# Patient Record
Sex: FEMALE | Race: WHITE | NOT HISPANIC OR LATINO | Employment: FULL TIME | URBAN - METROPOLITAN AREA
[De-identification: names, ages, dates, MRNs, and addresses within clinical notes are randomized per-mention and may not be internally consistent; named-entity substitution may affect disease eponyms.]

---

## 2018-07-09 ENCOUNTER — OFFICE VISIT (OUTPATIENT)
Dept: MEDICAL GROUP | Facility: MEDICAL CENTER | Age: 23
End: 2018-07-09
Payer: COMMERCIAL

## 2018-07-09 VITALS
TEMPERATURE: 99 F | SYSTOLIC BLOOD PRESSURE: 100 MMHG | BODY MASS INDEX: 25.83 KG/M2 | DIASTOLIC BLOOD PRESSURE: 70 MMHG | WEIGHT: 155 LBS | HEIGHT: 65 IN | OXYGEN SATURATION: 98 % | HEART RATE: 79 BPM

## 2018-07-09 DIAGNOSIS — J45.20 MILD INTERMITTENT ASTHMA WITHOUT COMPLICATION: ICD-10-CM

## 2018-07-09 DIAGNOSIS — E55.9 VITAMIN D DEFICIENCY: ICD-10-CM

## 2018-07-09 DIAGNOSIS — Z13.220 SCREENING FOR HYPERLIPIDEMIA: ICD-10-CM

## 2018-07-09 DIAGNOSIS — M25.571 ACUTE RIGHT ANKLE PAIN: ICD-10-CM

## 2018-07-09 DIAGNOSIS — Z13.89 SCREENING FOR MULTIPLE CONDITIONS: ICD-10-CM

## 2018-07-09 DIAGNOSIS — M79.674 GREAT TOE PAIN, RIGHT: ICD-10-CM

## 2018-07-09 DIAGNOSIS — M79.671 PAIN OF RIGHT HEEL: ICD-10-CM

## 2018-07-09 DIAGNOSIS — Z23 NEED FOR TDAP VACCINATION: ICD-10-CM

## 2018-07-09 DIAGNOSIS — Z23 NEED FOR HPV VACCINATION: ICD-10-CM

## 2018-07-09 PROCEDURE — 90715 TDAP VACCINE 7 YRS/> IM: CPT | Performed by: NURSE PRACTITIONER

## 2018-07-09 PROCEDURE — 90471 IMMUNIZATION ADMIN: CPT | Performed by: NURSE PRACTITIONER

## 2018-07-09 PROCEDURE — 90651 9VHPV VACCINE 2/3 DOSE IM: CPT | Performed by: NURSE PRACTITIONER

## 2018-07-09 PROCEDURE — 90472 IMMUNIZATION ADMIN EACH ADD: CPT | Performed by: NURSE PRACTITIONER

## 2018-07-09 PROCEDURE — 99214 OFFICE O/P EST MOD 30 MIN: CPT | Mod: 25 | Performed by: NURSE PRACTITIONER

## 2018-07-09 RX ORDER — ALBUTEROL SULFATE 90 UG/1
2 AEROSOL, METERED RESPIRATORY (INHALATION) EVERY 6 HOURS PRN
Qty: 8.5 G | Refills: 0 | Status: SHIPPED | OUTPATIENT
Start: 2018-07-09

## 2018-07-09 RX ORDER — MULTIVIT-MIN/IRON/FOLIC ACID/K 18-600-40
2000 CAPSULE ORAL DAILY
Qty: 30 CAP | Refills: 0
Start: 2018-07-09

## 2018-07-09 RX ORDER — DICLOFENAC SODIUM 75 MG/1
75 TABLET, DELAYED RELEASE ORAL 2 TIMES DAILY
Qty: 30 TAB | Refills: 0 | Status: SHIPPED | OUTPATIENT
Start: 2018-07-09 | End: 2018-12-26

## 2018-07-09 ASSESSMENT — PATIENT HEALTH QUESTIONNAIRE - PHQ9: CLINICAL INTERPRETATION OF PHQ2 SCORE: 0

## 2018-07-10 NOTE — PROGRESS NOTES
Subjective:     Brigid Fernandes is a 23 y.o. female who presents with rt ankle pain.    HPI:   Seen in f/u for ankle pain.  She twisted it at easter.  Still hurting occas  Also have pain in DIP joint great toe.  That has been occurring for several weelks.  It is also popping with moving.  She is having sx of plantar fasciititis.  Not always wearing supportive shoes.  Her worst pain is after walking for awhile.    She has been going to [facility] until she graduated.  When she last went in they asked her if she had asthma.  seh was prescribed inhaler.  She will havei ssues with exercise and occas at nite with wheezing.    She is having sleep paralysis.  It is occurring 2x/wk.  Mother thinks r/t her asthma.    She has hx of low vitamin d.  Took ergocalciferol .  Now on otc supplement.    She is due Td and HPV.   She is due prevenative lab with [facility].      Patient Active Problem List    Diagnosis Date Noted   • Chronic tonsillitis 03/08/2013       Current medicines (including changes today)  Current Outpatient Prescriptions   Medication Sig Dispense Refill   • Cholecalciferol (VITAMIN D) 2000 units Cap Take 1 Cap by mouth every day. 30 Cap 0   • diclofenac EC (VOLTAREN) 75 MG Tablet Delayed Response Take 1 Tab by mouth 2 times a day. 30 Tab 0   • albuterol 108 (90 Base) MCG/ACT Aero Soln inhalation aerosol Inhale 2 Puffs by mouth every 6 hours as needed for Shortness of Breath. 8.5 g 0     No current facility-administered medications for this visit.        Allergies   Allergen Reactions   • Nkda [No Known Drug Allergy]        ROS  Constitutional: Negative. Negative for fever, chills, weight loss, malaise/fatigue and diaphoresis.   HENT: Negative. Negative for hearing loss, ear pain, nosebleeds, congestion, sore throat, neck pain, tinnitus and ear discharge.   Respiratory: Negative. Negative for cough, hemoptysis, sputum production, stridor.   Cardiovascular: Negative. Negative for chest pain, palpitations,  "orthopnea, claudication, leg swelling and PND.   Gastrointestinal: Denies nausea, vomiting, diarrhea, constipation, heartburn, melena or hematochezia.  Genitourinary: Denies dysuria, hematuria, urinary incontinence, frequency or urgency.        Objective:     Blood pressure 100/70, pulse 79, temperature 37.2 °C (99 °F), height 1.651 m (5' 5\"), weight 70.3 kg (155 lb), SpO2 98 %, not currently breastfeeding. Body mass index is 25.79 kg/m².    Physical Exam:  Vitals reviewed.  Constitutional: Oriented to person, place, and time. appears well-developed and well-nourished. No distress.   Cardiovascular: Normal rate, regular rhythm, normal heart sounds and intact distal pulses. Exam reveals no gallop and no friction rub. No murmur heard. No carotid bruits.   Pulmonary/Chest: Effort normal and breath sounds normal. No stridor. No respiratory distress. no wheezes or rales. exhibits no tenderness.   Musculoskeletal: Normal range of motion but pain in rt great toe. exhibits no edema. haley pedal pulses 2+.  Lymphadenopathy: No cervical or supraclavicular adenopathy.   Neurological: Alert and oriented to person, place, and time. exhibits normal muscle tone.  Skin: Skin is warm and dry. No diaphoresis.   Psychiatric: Normal mood and affect. Behavior is normal.      Assessment and Plan:     The following treatment plan was discussed:    1. Acute right ankle pain  DX-FOOT-COMPLETE 3+ RIGHT    DX-ANKLE 3+ VIEWS RIGHT    diclofenac EC (VOLTAREN) 75 MG Tablet Delayed Response    try voltaren.  do xray of foot and ankel.  f/u with tp with resutls and approp f/u tx.     2. Great toe pain, right  DX-FOOT-COMPLETE 3+ RIGHT    DX-ANKLE 3+ VIEWS RIGHT    diclofenac EC (VOLTAREN) 75 MG Tablet Delayed Response    xray foot and f/u with pt wiht results.  try voltaren to see if helps sx.     3. Pain of right heel  DX-FOOT-COMPLETE 3+ RIGHT    DX-ANKLE 3+ VIEWS RIGHT    diclofenac EC (VOLTAREN) 75 MG Tablet Delayed Response   4. Mild " intermittent asthma without complication  albuterol 108 (90 Base) MCG/ACT Aero Soln inhalation aerosol    PULMONARY FUNCTION TESTS Test requested: Complete Pulmonary Function Test    refilled inhaler.  use beofre exercise and prn.  do PFT's. f/u with pt with resuts.    5. Vitamin D deficiency  Cholecalciferol (VITAMIN D) 2000 units Cap    VITAMIN D 25-HYDROXY    check lab with vitamin d, [facility]CMP, [facility].  f/u with pt wiht results.    6. Need for HPV vaccination  Gardasil 9   7. Screening for multiple conditions  CMP14+LP   8. Screening for hyperlipidemia  CMP14+LP   9. Need for Tdap vaccination  Tdap =>8yo IM         Followup: Return in about 1 year (around 7/9/2019).

## 2018-07-19 ENCOUNTER — HOSPITAL ENCOUNTER (OUTPATIENT)
Dept: PULMONOLOGY | Facility: MEDICAL CENTER | Age: 23
End: 2018-07-19
Attending: NURSE PRACTITIONER
Payer: COMMERCIAL

## 2018-07-19 PROCEDURE — 94726 PLETHYSMOGRAPHY LUNG VOLUMES: CPT | Mod: 26 | Performed by: INTERNAL MEDICINE

## 2018-07-19 PROCEDURE — 94060 EVALUATION OF WHEEZING: CPT

## 2018-07-19 PROCEDURE — 94729 DIFFUSING CAPACITY: CPT | Mod: 26 | Performed by: INTERNAL MEDICINE

## 2018-07-19 PROCEDURE — 94060 EVALUATION OF WHEEZING: CPT | Mod: 26 | Performed by: INTERNAL MEDICINE

## 2018-07-19 PROCEDURE — 94729 DIFFUSING CAPACITY: CPT

## 2018-07-19 PROCEDURE — 94726 PLETHYSMOGRAPHY LUNG VOLUMES: CPT

## 2018-07-19 ASSESSMENT — PULMONARY FUNCTION TESTS
FVC: 4.35
FVC_PREDICTED: 3.94
FEV1/FVC: 384
FVC: 4.54
FEV1_PERCENT_PREDICTED: 111
FEV1_LLN: 2.86
FVC_LLN: 3.29
FEV1/FVC_PERCENT_PREDICTED: 96
FVC_PERCENT_PREDICTED: 110
FEV1/FVC: 85
FEV1: 3.68
FEV1/FVC: 84.14
FEV1/FVC_PREDICTED: 87
FEV1/FVC_PERCENT_CHANGE: 75
FEV1_PERCENT_PREDICTED: 107
FEV1/FVC_PERCENT_PREDICTED: 97
FEV1_PERCENT_CHANGE: 4
FEV1/FVC_PERCENT_PREDICTED: 97
FEV1: 3.82
FEV1/FVC: 84
FEV1/FVC_PERCENT_PREDICTED: 87
FVC_PERCENT_PREDICTED: 115
FEV1/FVC_PERCENT_PREDICTED: 97
FEV1_PREDICTED: 3.42
FVC_LLN: 3.29
FEV1/FVC_PERCENT_LLN: 73
FEV1/FVC_PERCENT_LLN: 73
FEV1/FVC_PERCENT_CHANGE: 0
FEV1_LLN: 2.86
FEV1_PERCENT_CHANGE: 3

## 2018-07-20 NOTE — PROCEDURES
DATE OF TEST:  07/19/2018.    ATTENDING PHYSICIAN:  JENELLE Moody    INTERPRETATION:  Spirometry shows forced vital capacity is normal at 110% of   predicted value of 4.35, FEV1 is 107% with a value of 3.68 and FEV1/forced   vital capacity ratio is 84%.  The mid forced expiratory flow likewise is   normal at 91%.  A repeat spirometry after bronchodilator therapy shows no   significant change.    The lung volume study shows normal flow vital capacity at 108% with a value of   4.29.  Total gas volume 102% with a value of 2.91.  Residual volume is 1.52,   118%.  Total lung capacity 5.8, 111% and RV/TLC ratio is 26, which is 108%.    Diffusing capacity for carbon monoxide is normal at 112% of predicted with a   value of 29.38 and when corrected for hemoglobin content, it is 27.25 which is   104% of predicted.    CONCLUSION:  1.  Spirometry is normal, showing no airflow obstruction with no remarkable   change after bronchodilator therapy.  2.  Subdivision of the lung volumes are normal.  No restriction, no   hyperinflation, no air-trapping.  3.  Normal diffusing capacity for carbon monoxide.  4.  Normal pulmonary function tests.       ____________________________________     MD SIMÓN Evans / CB    DD:  07/19/2018 17:43:12  DT:  07/19/2018 18:09:13    D#:  [id number]  Job#:  [id number]

## 2018-07-23 ENCOUNTER — TELEPHONE (OUTPATIENT)
Dept: MEDICAL GROUP | Facility: MEDICAL CENTER | Age: 23
End: 2018-07-23

## 2018-07-24 NOTE — PROCEDURES
DATE OF STUDY:  07/19/2018    REASON FOR THE STUDY:  Mild intermittent asthma in a nonsmoker.  ATS standards   were met.    SPIROMETRY:  FVC is 4.35, 110% predicted.  FEV1 is 3.68, 107% predicted.    Response to bronchodilators is not considered significant.  FEV1/FVC is 84%.    MVV is 103% predicted.    LUNG VOLUMES:  Slow vital capacity is 4.29, 108% predicted.  Total lung   capacity is 111% predicted.  RV/TLC is 108% predicted.  Diffusion is normal.    Flow volume loops are normal.    IMPRESSION:  This is a normal study.       ____________________________________     MD SYLVIA HUTCHINSON / CB    DD:  07/23/2018 14:59:18  DT:  07/23/2018 15:15:15    D#:  [id number]  Job#:  [id number]

## 2018-09-04 ENCOUNTER — HOSPITAL ENCOUNTER (OUTPATIENT)
Facility: MEDICAL CENTER | Age: 23
End: 2018-09-04
Attending: NURSE PRACTITIONER
Payer: COMMERCIAL

## 2018-09-04 ENCOUNTER — OFFICE VISIT (OUTPATIENT)
Dept: MEDICAL GROUP | Facility: MEDICAL CENTER | Age: 23
End: 2018-09-04
Payer: COMMERCIAL

## 2018-09-04 VITALS
DIASTOLIC BLOOD PRESSURE: 70 MMHG | BODY MASS INDEX: 25.83 KG/M2 | OXYGEN SATURATION: 97 % | WEIGHT: 155 LBS | HEIGHT: 65 IN | HEART RATE: 89 BPM | SYSTOLIC BLOOD PRESSURE: 110 MMHG | TEMPERATURE: 98.1 F

## 2018-09-04 DIAGNOSIS — N30.01 ACUTE CYSTITIS WITH HEMATURIA: ICD-10-CM

## 2018-09-04 LAB
APPEARANCE UR: NORMAL
BILIRUB UR STRIP-MCNC: NORMAL MG/DL
COLOR UR AUTO: YELLOW
GLUCOSE UR STRIP.AUTO-MCNC: NORMAL MG/DL
KETONES UR STRIP.AUTO-MCNC: NORMAL MG/DL
LEUKOCYTE ESTERASE UR QL STRIP.AUTO: NORMAL
NITRITE UR QL STRIP.AUTO: NORMAL
PH UR STRIP.AUTO: 6.5 [PH] (ref 5–8)
PROT UR QL STRIP: NORMAL MG/DL
RBC UR QL AUTO: NORMAL
SP GR UR STRIP.AUTO: 1.02
UROBILINOGEN UR STRIP-MCNC: 0.2 MG/DL

## 2018-09-04 PROCEDURE — 87186 SC STD MICRODIL/AGAR DIL: CPT

## 2018-09-04 PROCEDURE — 87077 CULTURE AEROBIC IDENTIFY: CPT

## 2018-09-04 PROCEDURE — 99214 OFFICE O/P EST MOD 30 MIN: CPT | Performed by: NURSE PRACTITIONER

## 2018-09-04 PROCEDURE — 81001 URINALYSIS AUTO W/SCOPE: CPT

## 2018-09-04 PROCEDURE — 81002 URINALYSIS NONAUTO W/O SCOPE: CPT | Performed by: NURSE PRACTITIONER

## 2018-09-04 PROCEDURE — 87086 URINE CULTURE/COLONY COUNT: CPT

## 2018-09-04 RX ORDER — SULFAMETHOXAZOLE AND TRIMETHOPRIM 800; 160 MG/1; MG/1
1 TABLET ORAL 2 TIMES DAILY
Qty: 14 TAB | Refills: 0 | Status: SHIPPED | OUTPATIENT
Start: 2018-09-04 | End: 2018-10-02 | Stop reason: SDUPTHER

## 2018-09-04 NOTE — PROGRESS NOTES
"  Subjective:     Brigid Fernandes is a 23 y.o. female who presents with UTI.      HPI:   Seen in f/u for UTI.  Sx started yesterday.  She was up all nite. + dysuria.  Has been drinking a lot of water. No back or abd pain.  No fever, chills or sweating.  Has had UTI in the past and sx are similar.  + urinary freq and urgency.      Patient Active Problem List    Diagnosis Date Noted   • Chronic tonsillitis 03/08/2013       Current medicines (including changes today)  Current Outpatient Prescriptions   Medication Sig Dispense Refill   • sulfamethoxazole-trimethoprim (BACTRIM DS) 800-160 MG tablet Take 1 Tab by mouth 2 times a day. 14 Tab 0   • Cholecalciferol (VITAMIN D) 2000 units Cap Take 1 Cap by mouth every day. 30 Cap 0   • diclofenac EC (VOLTAREN) 75 MG Tablet Delayed Response Take 1 Tab by mouth 2 times a day. 30 Tab 0   • albuterol 108 (90 Base) MCG/ACT Aero Soln inhalation aerosol Inhale 2 Puffs by mouth every 6 hours as needed for Shortness of Breath. 8.5 g 0     No current facility-administered medications for this visit.        Allergies   Allergen Reactions   • Nkda [No Known Drug Allergy]        ROS  Constitutional: Negative. Negative for fever, chills, weight loss, malaise/fatigue and diaphoresis.   HENT: Negative. Negative for hearing loss, ear pain, nosebleeds, congestion, sore throat, neck pain, tinnitus and ear discharge.   Respiratory: Negative. Negative for cough, hemoptysis, sputum production, shortness of breath, wheezing and stridor.   Cardiovascular: Negative. Negative for chest pain, palpitations, orthopnea, claudication, leg swelling and PND.   Gastrointestinal: Denies nausea, vomiting, diarrhea, constipation, heartburn, melena or hematochezia.  Genitourinary: Denies hematuria, urinary incontinence.        Objective:     Blood pressure 110/70, pulse 89, temperature 36.7 °C (98.1 °F), height 1.651 m (5' 5\"), weight 70.3 kg (155 lb), last menstrual period 07/30/2018, SpO2 97 %, not " currently breastfeeding. Body mass index is 25.79 kg/m².    Physical Exam:  Physical Exam   Vitals reviewed.  Constitutional: oriented to person, place, and time. appears well-developed and well-nourished. No distress.   Cardiovascular: Normal rate, regular rhythm, normal heart sounds and intact distal pulses.  Exam reveals no gallop and no friction rub.  No murmur heard.  No carotid bruits.   Pulmonary/Chest: Effort normal and breath sounds normal. No stridor. No respiratory distress. no wheezes or rales. exhibits no tenderness.   Musculoskeletal: Normal range of motion. exhibits no edema. haley pedal pulses 2+.  Lymphadenopathy: no cervical or supraclavicular adenopathy.   Abd:  No CVAT,  Soft,  Bs noted in all quadrants.  No HSM.  Mild suprapubic abdominal tenderness.  Neurological: alert and oriented to person, place, and time. exhibits normal muscle tone. Coordination normal.   Skin: Skin is warm and dry. no diaphoresis.   Psychiatric: normal mood and affect. behavior is normal.       Assessment and Plan:     The following treatment plan was discussed:    1. Acute cystitis with hematuria  sulfamethoxazole-trimethoprim (BACTRIM DS) 800-160 MG tablet    URINALYSIS,CULTURE IF INDICATED    URINE CULTURE(NEW)    UA in office + with nitrates, leukocytes and blood.  bactrim x 7 days.  UA/CX to lab.  f/u if cx shows bactrim not sensitive.  then f/u prn and 7/19.           Followup: Return if symptoms worsen or fail to improve.

## 2018-09-05 LAB
APPEARANCE UR: ABNORMAL
BACTERIA #/AREA URNS HPF: ABNORMAL /HPF
COLOR UR: YELLOW
EPI CELLS #/AREA URNS HPF: ABNORMAL /HPF
GLUCOSE UR STRIP.AUTO-MCNC: NEGATIVE MG/DL
HYALINE CASTS #/AREA URNS LPF: ABNORMAL /LPF
KETONES UR STRIP.AUTO-MCNC: NEGATIVE MG/DL
LEUKOCYTE ESTERASE UR QL STRIP.AUTO: ABNORMAL
MICRO URNS: ABNORMAL
NITRITE UR QL STRIP.AUTO: POSITIVE
PH UR STRIP.AUTO: 7 [PH]
PROT UR QL STRIP: NEGATIVE MG/DL
RBC # URNS HPF: ABNORMAL /HPF
RBC UR QL AUTO: ABNORMAL
SP GR UR STRIP.AUTO: 1.01
WBC #/AREA URNS HPF: ABNORMAL /HPF

## 2018-09-07 LAB
BACTERIA UR CULT: ABNORMAL
BACTERIA UR CULT: ABNORMAL
SIGNIFICANT IND 70042: ABNORMAL
SITE SITE: ABNORMAL
SOURCE SOURCE: ABNORMAL

## 2018-09-12 ENCOUNTER — TELEPHONE (OUTPATIENT)
Dept: MEDICAL GROUP | Facility: MEDICAL CENTER | Age: 23
End: 2018-09-12

## 2018-10-02 ENCOUNTER — HOSPITAL ENCOUNTER (OUTPATIENT)
Facility: MEDICAL CENTER | Age: 23
End: 2018-10-02
Attending: NURSE PRACTITIONER
Payer: COMMERCIAL

## 2018-10-02 ENCOUNTER — OFFICE VISIT (OUTPATIENT)
Dept: MEDICAL GROUP | Facility: MEDICAL CENTER | Age: 23
End: 2018-10-02
Payer: COMMERCIAL

## 2018-10-02 VITALS
BODY MASS INDEX: 25.19 KG/M2 | DIASTOLIC BLOOD PRESSURE: 68 MMHG | TEMPERATURE: 98 F | HEART RATE: 80 BPM | SYSTOLIC BLOOD PRESSURE: 112 MMHG | WEIGHT: 151.4 LBS | OXYGEN SATURATION: 97 %

## 2018-10-02 DIAGNOSIS — N30.01 ACUTE CYSTITIS WITH HEMATURIA: ICD-10-CM

## 2018-10-02 PROCEDURE — 81001 URINALYSIS AUTO W/SCOPE: CPT

## 2018-10-02 PROCEDURE — 99214 OFFICE O/P EST MOD 30 MIN: CPT | Performed by: NURSE PRACTITIONER

## 2018-10-02 PROCEDURE — 87086 URINE CULTURE/COLONY COUNT: CPT

## 2018-10-02 RX ORDER — SULFAMETHOXAZOLE AND TRIMETHOPRIM 800; 160 MG/1; MG/1
1 TABLET ORAL 2 TIMES DAILY
Qty: 14 TAB | Refills: 0 | Status: SHIPPED | OUTPATIENT
Start: 2018-10-02 | End: 2018-12-26

## 2018-10-02 NOTE — PROGRESS NOTES
Subjective:     Brigid Fernandes is a 23 y.o. female who presents with UTI.    HPI:   Seen in f/u for UTI.  Sx started yesterday.  Sx were severe overnite.  Occurred for 2nd time after using lubricant.  It was oil based hybrid.  + dysuria.  No back or abd pain.  No fever, chills or sweating.  + nausea.  No hematuria noted.  Same sx as previous UTI.        Patient Active Problem List    Diagnosis Date Noted   • Chronic tonsillitis 03/08/2013       Current medicines (including changes today)  Current Outpatient Prescriptions   Medication Sig Dispense Refill   • sulfamethoxazole-trimethoprim (BACTRIM DS) 800-160 MG tablet Take 1 Tab by mouth 2 times a day. 14 Tab 0   • Cholecalciferol (VITAMIN D) 2000 units Cap Take 1 Cap by mouth every day. 30 Cap 0   • diclofenac EC (VOLTAREN) 75 MG Tablet Delayed Response Take 1 Tab by mouth 2 times a day. 30 Tab 0   • albuterol 108 (90 Base) MCG/ACT Aero Soln inhalation aerosol Inhale 2 Puffs by mouth every 6 hours as needed for Shortness of Breath. 8.5 g 0     No current facility-administered medications for this visit.        Allergies   Allergen Reactions   • Nkda [No Known Drug Allergy]        ROS  Constitutional: Negative. Negative for fever, chills, weight loss, malaise/fatigue and diaphoresis.   HENT: Negative. Negative for hearing loss, ear pain, nosebleeds, congestion, sore throat, neck pain, tinnitus and ear discharge.   Respiratory: Negative. Negative for cough, hemoptysis, sputum production, shortness of breath, wheezing and stridor.   Cardiovascular: Negative. Negative for chest pain, palpitations, orthopnea, claudication, leg swelling and PND.   Gastrointestinal: Denies nausea, vomiting, diarrhea, constipation, heartburn, melena or hematochezia.  Genitourinary: Denies dysuria, hematuria, urinary incontinence, frequency or urgency.        Objective:     Blood pressure 112/68, pulse 80, temperature 36.7 °C (98 °F), temperature source Temporal, weight 68.7 kg (151  lb 6.4 oz), last menstrual period 09/11/2018, SpO2 97 %, not currently breastfeeding. Body mass index is 25.19 kg/m².    Physical Exam:  Vitals reviewed.  Constitutional: Oriented to person, place, and time. appears well-developed and well-nourished. No distress.   Cardiovascular: Normal rate, regular rhythm, normal heart sounds and intact distal pulses. Exam reveals no gallop and no friction rub. No murmur heard. No carotid bruits.   Pulmonary/Chest: Effort normal and breath sounds normal. No stridor. No respiratory distress. no wheezes or rales. exhibits no tenderness.   Musculoskeletal: Normal range of motion. exhibits no edema. haley pedal pulses 2+.  Lymphadenopathy: No cervical or supraclavicular adenopathy.   Neurological: Alert and oriented to person, place, and time. exhibits normal muscle tone.  Skin: Skin is warm and dry. No diaphoresis.   Psychiatric: Normal mood and affect. Behavior is normal.      Assessment and Plan:     The following treatment plan was discussed:    1. Acute cystitis with hematuria  sulfamethoxazole-trimethoprim (BACTRIM DS) 800-160 MG tablet    URINALYSIS,CULTURE IF INDICATED    URINE CULTURE(NEW)    UA in office not done d/t taking azo.  bactrim x 7 days.  UA/CX to lab.  f/u if cx shows bactrim not sensitive.  then f/u prn and 7/19.         Followup: Return in about 9 months (around 7/2/2019).

## 2018-10-03 LAB
APPEARANCE UR: CLEAR
BACTERIA #/AREA URNS HPF: ABNORMAL /HPF
BILIRUB UR QL STRIP.AUTO: NEGATIVE
COLOR UR: YELLOW
GLUCOSE UR STRIP.AUTO-MCNC: 100 MG/DL
KETONES UR STRIP.AUTO-MCNC: NEGATIVE MG/DL
LEUKOCYTE ESTERASE UR QL STRIP.AUTO: ABNORMAL
MICRO URNS: ABNORMAL
NITRITE UR QL STRIP.AUTO: POSITIVE
PH UR STRIP.AUTO: 7.5 [PH]
PROT UR QL STRIP: NEGATIVE MG/DL
RBC # URNS HPF: ABNORMAL /HPF
RBC UR QL AUTO: ABNORMAL
SP GR UR STRIP.AUTO: 1.01
UROBILINOGEN UR STRIP.AUTO-MCNC: 1 MG/DL
WBC #/AREA URNS HPF: ABNORMAL /HPF

## 2018-10-05 LAB
BACTERIA UR CULT: NORMAL
SIGNIFICANT IND 70042: NORMAL
SITE SITE: NORMAL
SOURCE SOURCE: NORMAL

## 2018-10-07 ENCOUNTER — TELEPHONE (OUTPATIENT)
Dept: MEDICAL GROUP | Facility: MEDICAL CENTER | Age: 23
End: 2018-10-07

## 2018-10-08 NOTE — TELEPHONE ENCOUNTER
Please let pt know that the UA shows only skin ayana.  Go ahead and take the antibx.  If not getting better will refer to urology

## 2018-10-08 NOTE — TELEPHONE ENCOUNTER
Please let pt know that the UA/CX shows only nrmal skin ayana.  Go ahead and finish the antibx.  If not improved we will refer to urology.

## 2018-11-29 ENCOUNTER — OFFICE VISIT (OUTPATIENT)
Dept: URGENT CARE | Facility: CLINIC | Age: 23
End: 2018-11-29
Payer: COMMERCIAL

## 2018-11-29 VITALS
SYSTOLIC BLOOD PRESSURE: 136 MMHG | BODY MASS INDEX: 24.99 KG/M2 | DIASTOLIC BLOOD PRESSURE: 80 MMHG | TEMPERATURE: 99.9 F | RESPIRATION RATE: 16 BRPM | OXYGEN SATURATION: 97 % | HEIGHT: 65 IN | WEIGHT: 150 LBS | HEART RATE: 117 BPM

## 2018-11-29 DIAGNOSIS — R11.2 NAUSEA AND VOMITING, INTRACTABILITY OF VOMITING NOT SPECIFIED, UNSPECIFIED VOMITING TYPE: ICD-10-CM

## 2018-11-29 DIAGNOSIS — K52.9 GASTROENTERITIS: ICD-10-CM

## 2018-11-29 LAB
APPEARANCE UR: NORMAL
BILIRUB UR STRIP-MCNC: NORMAL MG/DL
COLOR UR AUTO: YELLOW
FLUAV+FLUBV AG SPEC QL IA: NEGATIVE
GLUCOSE UR STRIP.AUTO-MCNC: NORMAL MG/DL
INT CON NEG: NORMAL
INT CON NEG: NORMAL
INT CON POS: NORMAL
INT CON POS: NORMAL
KETONES UR STRIP.AUTO-MCNC: NORMAL MG/DL
LEUKOCYTE ESTERASE UR QL STRIP.AUTO: NORMAL
NITRITE UR QL STRIP.AUTO: NORMAL
PH UR STRIP.AUTO: 5.5 [PH] (ref 5–8)
POC URINE PREGNANCY TEST: NEGATIVE
PROT UR QL STRIP: 30 MG/DL
RBC UR QL AUTO: NORMAL
SP GR UR STRIP.AUTO: 1.03
UROBILINOGEN UR STRIP-MCNC: 1 MG/DL

## 2018-11-29 PROCEDURE — 81002 URINALYSIS NONAUTO W/O SCOPE: CPT | Performed by: PHYSICIAN ASSISTANT

## 2018-11-29 PROCEDURE — 87804 INFLUENZA ASSAY W/OPTIC: CPT | Performed by: PHYSICIAN ASSISTANT

## 2018-11-29 PROCEDURE — 99214 OFFICE O/P EST MOD 30 MIN: CPT | Performed by: PHYSICIAN ASSISTANT

## 2018-11-29 PROCEDURE — 81025 URINE PREGNANCY TEST: CPT | Performed by: PHYSICIAN ASSISTANT

## 2018-11-29 RX ORDER — ONDANSETRON 4 MG/1
4 TABLET, ORALLY DISINTEGRATING ORAL ONCE
Status: DISCONTINUED | OUTPATIENT
Start: 2018-11-29 | End: 2018-11-29

## 2018-11-29 RX ORDER — ONDANSETRON 4 MG/1
4 TABLET, FILM COATED ORAL EVERY 6 HOURS PRN
Qty: 20 TAB | Refills: 1 | Status: SHIPPED | OUTPATIENT
Start: 2018-11-29 | End: 2018-12-26

## 2018-11-29 ASSESSMENT — ENCOUNTER SYMPTOMS
NECK PAIN: 0
NAUSEA: 1
FEVER: 1
BACK PAIN: 0
SORE THROAT: 0
ABDOMINAL PAIN: 0
WHEEZING: 0
SHORTNESS OF BREATH: 0
DIARRHEA: 0
VOMITING: 1
FLANK PAIN: 0
COUGH: 1
MYALGIAS: 1
NUMBER OF EPISODES OF EMESIS TODAY: 1
HEARTBURN: 0
SPUTUM PRODUCTION: 0
CHILLS: 1
CONSTIPATION: 0
BLOOD IN STOOL: 0

## 2018-11-29 NOTE — PROGRESS NOTES
Subjective:   Brigid Fernandes is a 23 y.o. female who presents for Emesis (x last night, vomiting, nausea and headache.  Shortness of breath early today and bodyaches)        Notes last night w/ rather abrupt onset of nausea and emesis around 9pm, mild cough and congestion today, fever/chills and body aches, denies UTI s/sx, denies dysuria/freq/urg/hematuria, c/o body aches to joints of lower extremities, denies ST/ear pain, c/o mild cough, denies wheeze, denies abd/back pain, denies pMH of asthma/bronchitis/pneumonia, denies PMH of abd surgery, Denies chance of pregnancy (nexplanon) - denies diarrhea / constipation or blood per stool. Denies rec'ing flu shot. Denies tx last night or today.  Denies recent: abx, international travel, preparing water from outdoor sources or suspicious food sources.          Emesis   Associated symptoms include chills, congestion, coughing, a fever, myalgias, nausea and vomiting. Pertinent negatives include no abdominal pain, neck pain, rash or sore throat.     Review of Systems   Constitutional: Positive for chills and fever.   HENT: Positive for congestion. Negative for ear pain and sore throat.    Respiratory: Positive for cough. Negative for sputum production, shortness of breath and wheezing.    Gastrointestinal: Positive for nausea and vomiting. Negative for abdominal pain, blood in stool, constipation, diarrhea, heartburn and melena.   Genitourinary: Negative for dysuria, flank pain, frequency, hematuria and urgency.   Musculoskeletal: Positive for myalgias. Negative for back pain and neck pain.   Skin: Negative for rash.   Endo/Heme/Allergies: Positive for environmental allergies.     Allergies   Allergen Reactions   • Nkda [No Known Drug Allergy]    I have worn a mask for the entire encounter with this patient.    Objective:   /80 (BP Location: Left arm, Patient Position: Sitting, BP Cuff Size: Adult)   Pulse (!) 117   Temp 37.7 °C (99.9 °F) (Temporal)   Resp 16  "  Ht 1.651 m (5' 5\")   Wt 68 kg (150 lb)   SpO2 97%   BMI 24.96 kg/m²   Physical Exam   Constitutional: She is oriented to person, place, and time. She appears well-developed and well-nourished. No distress.   HENT:   Head: Normocephalic and atraumatic.   Right Ear: Tympanic membrane, external ear and ear canal normal.   Left Ear: Tympanic membrane, external ear and ear canal normal.   Nose: Nose normal.   Mouth/Throat: Uvula is midline and mucous membranes are normal. Posterior oropharyngeal erythema (moderate) present. No oropharyngeal exudate, posterior oropharyngeal edema or tonsillar abscesses.   Eyes: Conjunctivae and lids are normal. Right eye exhibits no discharge. Left eye exhibits no discharge. No scleral icterus.   Neck: Neck supple.   Pulmonary/Chest: Effort normal. No respiratory distress. She has no decreased breath sounds. She has no wheezes. She has no rhonchi. She has no rales.   Abdominal: Soft. Normal appearance and bowel sounds are normal. There is no tenderness. There is no rigidity, no rebound, no guarding, no CVA tenderness, no tenderness at McBurney's point and negative Bills's sign.   Musculoskeletal: Normal range of motion.   Lymphadenopathy:     She has cervical adenopathy ( mild bilat).   Neurological: She is alert and oriented to person, place, and time. She is not disoriented.   Skin: Skin is warm and dry. She is not diaphoretic. No erythema. No pallor.   Psychiatric: Her speech is normal and behavior is normal.   Nursing note and vitals reviewed.  POCT flu - NEG  POCT UA -   POC Color Yellow  Negative Final   POC Appearance cloudy  Negative Final   POC Leukocyte Esterase neg  Negative Final   POC Nitrites neg  Negative Final   POC Urobiligen 1.0  Negative (0.2) mg/dL Final   POC Protein 30  Negative mg/dL Final   POC Urine PH 5.5  5.0 - 8.0 Final   POC Blood small  Negative Final   POC Specific Gravity 1.030  <1.005 - >1.030 Final   POC Ketones trace  Negative mg/dL Final   POC " Bilirubin small  Negative mg/dL Final   POC Glucose neg  Negative mg/dL Final     POCT HCG - NEG      Assessment/Plan:   1. Gastroenteritis    2. Nausea and vomiting, intractability of vomiting not specified, unspecified vomiting type  - POCT Urinalysis  - POCT Pregnancy  - ondansetron (ZOFRAN) 4 MG Tab tablet; Take 1 Tab by mouth every 6 hours as needed for Nausea/Vomiting.  Dispense: 20 Tab; Refill: 1  - POCT Influenza A/B    Other orders  - Etonogestrel (NEXPLANON SC); Inject  as instructed.  Supportive care is reviewed with patient/caregiver - recommend to push PO fluids and electrolytes, we discuss principles of fluid resuscitation, BRAT diet, use of zofran, typical timeframe of expected improvement  Return to clinic with lack of resolution or progression of symptoms.    Differential diagnosis, natural history, supportive care, and indications for immediate follow-up discussed.

## 2018-12-26 ENCOUNTER — OFFICE VISIT (OUTPATIENT)
Dept: MEDICAL GROUP | Facility: MEDICAL CENTER | Age: 23
End: 2018-12-26
Payer: COMMERCIAL

## 2018-12-26 VITALS
OXYGEN SATURATION: 99 % | RESPIRATION RATE: 14 BRPM | WEIGHT: 150 LBS | DIASTOLIC BLOOD PRESSURE: 66 MMHG | TEMPERATURE: 99.2 F | SYSTOLIC BLOOD PRESSURE: 100 MMHG | HEART RATE: 83 BPM | HEIGHT: 65 IN | BODY MASS INDEX: 24.99 KG/M2

## 2018-12-26 DIAGNOSIS — J06.9 ACUTE URI: ICD-10-CM

## 2018-12-26 DIAGNOSIS — J02.9 SORE THROAT: ICD-10-CM

## 2018-12-26 LAB
INT CON NEG: NEGATIVE
INT CON POS: POSITIVE
S PYO AG THROAT QL: NEGATIVE

## 2018-12-26 PROCEDURE — 87880 STREP A ASSAY W/OPTIC: CPT | Performed by: FAMILY MEDICINE

## 2018-12-26 PROCEDURE — 99213 OFFICE O/P EST LOW 20 MIN: CPT | Performed by: FAMILY MEDICINE

## 2018-12-26 NOTE — PROGRESS NOTES
"Chief Complaint   Patient presents with   • Pharyngitis     had tonsils removed 5 years ago but has the worst sore throat and feels like strep, present since Sunday. No other symptoms. Has painful swollen lymph node under right arm.        HPI: This is a 23 y.o. patient has 3 days of sore throat, occasional cough and not much congestion.  Denies runny nose.  Denies ear fullness. No abnormal shortness of breath. No nausea vomiting or diarrhea.  Denies fever and chills.  Denies sick exposures. No coughing up blood. No headache.  Patient has not taken over-the-counter analgesics and decongestant.  She had a tonsillectomy about 5 years ago.  She also noted a tender lymph node in her right arm although it seems now resolved.     ROS:  No fever, cough, nausea, changes in bowel movements or skin rash.  No shortness of breath.     I reviewed the patient's medications, allergies and medical history:  Current Outpatient Prescriptions   Medication Sig Dispense Refill   • Etonogestrel (NEXPLANON SC) Inject  as instructed.     • Cholecalciferol (VITAMIN D) 2000 units Cap Take 1 Cap by mouth every day. 30 Cap 0   • albuterol 108 (90 Base) MCG/ACT Aero Soln inhalation aerosol Inhale 2 Puffs by mouth every 6 hours as needed for Shortness of Breath. 8.5 g 0     No current facility-administered medications for this visit.      Nkda [no known drug allergy]  Past Medical History:   Diagnosis Date   • Chronic tonsillitis         EXAM:  Blood pressure 100/66, pulse 83, temperature 37.3 °C (99.2 °F), temperature source Temporal, resp. rate 14, height 1.651 m (5' 5\"), weight 68 kg (150 lb), SpO2 99 %, not currently breastfeeding.  General: NAD, non-toxic appearance.  Eyes: PERRL, conjunctiva slightly injected, no photophobia or eye discharge.  Ears: Normal pinnae. TM's pearly gray with good landmarks bilaterally.  Nares: Patent with thin, clear mucus.  Sinuses: Non-tender over maxillary and frontal sinuses.  Throat: Mildly erythematous " injection.  No exudates.   Neck: Supple, with shotty anterior cervical lymphadenopathy.  No axillary adenopathy bilaterally.  Lungs: Good air entry bilaterally, clear to auscultation. No wheeze, rhonchi or crackles. Normal respiratory effort.  Heart: Regular rate without murmur.  Abdomen: Soft and non-tender. No hepatosplenomegaly.  Skin: Warm and dry. No rash.     Results for orders placed or performed in visit on 12/26/18   POCT Rapid Strep A   Result Value Ref Range    Rapid Strep Screen Negative     Internal Control Positive Positive     Internal Control Negative Negative         ASSESSMENT:   Viral URI with pharyngitis.     PLAN:  1. Discussed benign nature of viral upper respiratory infections.  2. Supportive care advised.  3. Follow-up in office or urgent care for worsening symptoms, difficulty breathing, lack of expected recovery, or should new symptoms or problems arise.

## 2018-12-26 NOTE — PATIENT INSTRUCTIONS
Pharyngitis  Pharyngitis is redness, pain, and swelling (inflammation) of your pharynx.  What are the causes?  Pharyngitis is usually caused by infection. Most of the time, these infections are from viruses (viral) and are part of a cold. However, sometimes pharyngitis is caused by bacteria (bacterial). Pharyngitis can also be caused by allergies. Viral pharyngitis may be spread from person to person by coughing, sneezing, and personal items or utensils (cups, forks, spoons, toothbrushes). Bacterial pharyngitis may be spread from person to person by more intimate contact, such as kissing.  What are the signs or symptoms?  Symptoms of pharyngitis include:  · Sore throat.  · Tiredness (fatigue).  · Low-grade fever.  · Headache.  · Joint pain and muscle aches.  · Skin rashes.  · Swollen lymph nodes.  · Plaque-like film on throat or tonsils (often seen with bacterial pharyngitis).  How is this diagnosed?  Your health care provider will ask you questions about your illness and your symptoms. Your medical history, along with a physical exam, is often all that is needed to diagnose pharyngitis. Sometimes, a rapid strep test is done. Other lab tests may also be done, depending on the suspected cause.  How is this treated?  Viral pharyngitis will usually get better in 3-4 days without the use of medicine. Bacterial pharyngitis is treated with medicines that kill germs (antibiotics).  Follow these instructions at home:  · Drink enough water and fluids to keep your urine clear or pale yellow.  · Only take over-the-counter or prescription medicines as directed by your health care provider:  ¨ If you are prescribed antibiotics, make sure you finish them even if you start to feel better.  ¨ Do not take aspirin.  · Get lots of rest.  · Gargle with 8 oz of salt water (½ tsp of salt per 1 qt of water) as often as every 1-2 hours to soothe your throat.  · Throat lozenges (if you are not at risk for choking) or sprays may be used to  soothe your throat.  Contact a health care provider if:  · You have large, tender lumps in your neck.  · You have a rash.  · You cough up green, yellow-brown, or bloody spit.  Get help right away if:  · Your neck becomes stiff.  · You drool or are unable to swallow liquids.  · You vomit or are unable to keep medicines or liquids down.  · You have severe pain that does not go away with the use of recommended medicines.  · You have trouble breathing (not caused by a stuffy nose).  This information is not intended to replace advice given to you by your health care provider. Make sure you discuss any questions you have with your health care provider.  Document Released: 12/18/2006 Document Revised: 05/25/2017 Document Reviewed: 08/25/2014  [employer] Interactive Patient Education © 2017 [employer]

## 2019-04-25 ENCOUNTER — OFFICE VISIT (OUTPATIENT)
Dept: MEDICAL GROUP | Facility: MEDICAL CENTER | Age: 24
End: 2019-04-25
Payer: COMMERCIAL

## 2019-04-25 ENCOUNTER — HOSPITAL ENCOUNTER (OUTPATIENT)
Facility: MEDICAL CENTER | Age: 24
End: 2019-04-25
Attending: FAMILY MEDICINE
Payer: COMMERCIAL

## 2019-04-25 VITALS
HEART RATE: 60 BPM | DIASTOLIC BLOOD PRESSURE: 62 MMHG | TEMPERATURE: 98.8 F | OXYGEN SATURATION: 96 % | WEIGHT: 137 LBS | SYSTOLIC BLOOD PRESSURE: 104 MMHG | HEIGHT: 65 IN | BODY MASS INDEX: 22.82 KG/M2

## 2019-04-25 DIAGNOSIS — R30.0 DYSURIA: ICD-10-CM

## 2019-04-25 DIAGNOSIS — Z11.3 SCREEN FOR STD (SEXUALLY TRANSMITTED DISEASE): ICD-10-CM

## 2019-04-25 LAB
APPEARANCE UR: NORMAL
BILIRUB UR STRIP-MCNC: NEGATIVE MG/DL
COLOR UR AUTO: YELLOW
GLUCOSE UR STRIP.AUTO-MCNC: NEGATIVE MG/DL
KETONES UR STRIP.AUTO-MCNC: NEGATIVE MG/DL
LEUKOCYTE ESTERASE UR QL STRIP.AUTO: NORMAL
NITRITE UR QL STRIP.AUTO: NEGATIVE
PH UR STRIP.AUTO: 7 [PH] (ref 5–8)
PROT UR QL STRIP: NORMAL MG/DL
RBC UR QL AUTO: NORMAL
SP GR UR STRIP.AUTO: 1.02
UROBILINOGEN UR STRIP-MCNC: 0.2 MG/DL

## 2019-04-25 PROCEDURE — 87077 CULTURE AEROBIC IDENTIFY: CPT

## 2019-04-25 PROCEDURE — 87086 URINE CULTURE/COLONY COUNT: CPT

## 2019-04-25 PROCEDURE — 99214 OFFICE O/P EST MOD 30 MIN: CPT | Performed by: FAMILY MEDICINE

## 2019-04-25 PROCEDURE — 87491 CHLMYD TRACH DNA AMP PROBE: CPT

## 2019-04-25 PROCEDURE — 81002 URINALYSIS NONAUTO W/O SCOPE: CPT | Performed by: FAMILY MEDICINE

## 2019-04-25 PROCEDURE — 87591 N.GONORRHOEAE DNA AMP PROB: CPT

## 2019-04-25 RX ORDER — SULFAMETHOXAZOLE AND TRIMETHOPRIM 800; 160 MG/1; MG/1
1 TABLET ORAL 2 TIMES DAILY
Qty: 10 TAB | Refills: 0 | Status: SHIPPED | OUTPATIENT
Start: 2019-04-25 | End: 2019-04-30

## 2019-04-25 ASSESSMENT — PATIENT HEALTH QUESTIONNAIRE - PHQ9: CLINICAL INTERPRETATION OF PHQ2 SCORE: 0

## 2019-04-25 NOTE — PROGRESS NOTES
"Subjective:   Brigid Fernandes is a 23 y.o. female here today for UTI    For the past 3 days patient has had burning with urination and increased urinary frequency.  She has had UTIs in the past, typically around not urinating after intercourse.  She believes this last time she did not urinate after intercourse and that is the reason she is having symptoms now.  Symptoms also occurred about 1 month ago, resulting bladder spasm but after a few days symptoms resolved until 3 days ago.  Patient hydrates regularly.    She is currently in a stable relationship but has had a few partner since her last STD testing 1 or 2 years ago.  She is requesting to be screened for gonorrhea and chlamydia.  She declines HIV, syphilis, hepatitis C screening.    Current medicines (including changes today)  Current Outpatient Prescriptions   Medication Sig Dispense Refill   • sulfamethoxazole-trimethoprim (BACTRIM DS) 800-160 MG tablet Take 1 Tab by mouth 2 times a day for 5 days. 10 Tab 0   • Etonogestrel (NEXPLANON SC) Inject  as instructed.     • Cholecalciferol (VITAMIN D) 2000 units Cap Take 1 Cap by mouth every day. 30 Cap 0   • albuterol 108 (90 Base) MCG/ACT Aero Soln inhalation aerosol Inhale 2 Puffs by mouth every 6 hours as needed for Shortness of Breath. 8.5 g 0     No current facility-administered medications for this visit.      She  has a past medical history of Chronic tonsillitis. She also has no past medical history of CAD (coronary artery disease); COPD; Liver disease; Psychiatric disorder; or Seizure disorder (HCC).    ROS   No fever, no vaginal discharge, no pelvic pain, no flank pain       Objective:     /62 (BP Location: Right arm, Patient Position: Sitting)   Pulse 60   Temp 37.1 °C (98.8 °F)   Ht 1.651 m (5' 5\")   Wt 62.1 kg (137 lb)   SpO2 96%  Body mass index is 22.8 kg/m².   Physical Exam:  Constitutional: Alert, no distress.  Skin: Warm, dry, good turgor, no rashes in visible areas.  Eye: " Equal, round and reactive, conjunctiva clear, lids normal.  Psych: Alert and oriented x3, normal affect and mood.    Results for YAMIL STORY (MRN [id number]) as of 4/25/2019 11:57   Ref. Range 4/25/2019 11:55   POC Color Latest Ref Range: Negative  Yellow   POC Appearance Latest Ref Range: Negative  cloudy   POC Specific Gravity Latest Ref Range: <1.005 - >1.030  1.025   POC Urine PH Latest Ref Range: 5.0 - 8.0  7.0   POC Glucose Latest Ref Range: Negative mg/dL negative   POC Ketones Latest Ref Range: Negative mg/dL negative   POC Protein Latest Ref Range: Negative mg/dL trace   POC Nitrites Latest Ref Range: Negative  negative   POC Leukocyte Esterase Latest Ref Range: Negative  small   POC Blood Latest Ref Range: Negative  large   POC Bilirubin Latest Ref Range: Negative mg/dL negative   POC Urobiligen Latest Ref Range: Negative (0.2) mg/dL 0.2       Assessment and Plan:   The following treatment plan was discussed    1. Dysuria  New problem.  UA and symptoms are suspicious for a UTI.  We will start patient on Bactrim.  Urine culture ordered, call with results.  - POCT Urinalysis  - URINE CULTURE(NEW); Future  - sulfamethoxazole-trimethoprim (BACTRIM DS) 800-160 MG tablet; Take 1 Tab by mouth 2 times a day for 5 days.  Dispense: 10 Tab; Refill: 0    2. Screen for STD (sexually transmitted disease)  Screening for chlamydia and gonorrhea done today.  Call with results.  - Chlamydia/GC PCR Urine Or Swab; Future        Followup: Return if symptoms worsen or fail to improve.

## 2019-04-27 LAB
C TRACH DNA SPEC QL NAA+PROBE: NEGATIVE
N GONORRHOEA DNA SPEC QL NAA+PROBE: NEGATIVE
SPECIMEN SOURCE: NORMAL

## 2019-04-29 ENCOUNTER — TELEPHONE (OUTPATIENT)
Dept: MEDICAL GROUP | Facility: MEDICAL CENTER | Age: 24
End: 2019-04-29

## 2019-04-29 NOTE — TELEPHONE ENCOUNTER
----- Message from German Fernandes M.D. sent at 4/29/2019  7:37 AM PDT -----  Please notify patient that her STD testing came back negative.  Her urine test did come back positive for an infection.  Please ask if she is feeling better after taking the antibiotics.  If she is not, we might need to change the antibiotic.  German Fernandes M.D.

## 2019-07-24 ENCOUNTER — OFFICE VISIT (OUTPATIENT)
Dept: MEDICAL GROUP | Facility: MEDICAL CENTER | Age: 24
End: 2019-07-24
Payer: COMMERCIAL

## 2019-07-24 VITALS
DIASTOLIC BLOOD PRESSURE: 72 MMHG | OXYGEN SATURATION: 97 % | WEIGHT: 137 LBS | HEIGHT: 65 IN | TEMPERATURE: 97.9 F | HEART RATE: 95 BPM | BODY MASS INDEX: 22.82 KG/M2 | SYSTOLIC BLOOD PRESSURE: 102 MMHG

## 2019-07-24 DIAGNOSIS — R21 RASH: ICD-10-CM

## 2019-07-24 PROCEDURE — 99214 OFFICE O/P EST MOD 30 MIN: CPT | Performed by: NURSE PRACTITIONER

## 2019-07-24 RX ORDER — CLOBETASOL PROPIONATE 0.5 MG/G
1 CREAM TOPICAL 2 TIMES DAILY
Qty: 1 TUBE | Refills: 1 | Status: SHIPPED | OUTPATIENT
Start: 2019-07-24

## 2019-07-24 RX ORDER — KETOCONAZOLE 20 MG/G
1 CREAM TOPICAL 2 TIMES DAILY
Qty: 1 TUBE | Refills: 1 | Status: SHIPPED | OUTPATIENT
Start: 2019-07-24

## 2019-07-24 NOTE — PROGRESS NOTES
Subjective:     Brigid Fernandes is a 24 y.o. female who presents with rash.    HPI:   Seen in f/u for rash.  She was in [place].  She started having the rash on rt thigh and buttock.  [place] did [place].  When to pharmacy there and they thought alergic reaction.  She used steroid cream.  Go t some better but never truely resolved.  + lots of itching.  Now back home for week and still red.  No fever, chills or sweating.  No dg from site.  Not expanding.        Current medicines (including changes today)  Current Outpatient Prescriptions   Medication Sig Dispense Refill   • ketoconazole (NIZORAL) 2 % Cream Apply 1 Application to affected area(s) 2 times a day. 1 Tube 1   • clobetasol (TEMOVATE) 0.05 % Cream Apply 1 Application to affected area(s) 2 times a day. 1 Tube 1   • Etonogestrel (NEXPLANON SC) Inject  as instructed.     • Cholecalciferol (VITAMIN D) 2000 units Cap Take 1 Cap by mouth every day. 30 Cap 0   • albuterol 108 (90 Base) MCG/ACT Aero Soln inhalation aerosol Inhale 2 Puffs by mouth every 6 hours as needed for Shortness of Breath. 8.5 g 0     No current facility-administered medications for this visit.        Allergies   Allergen Reactions   • Nkda [No Known Drug Allergy]        ROS  Constitutional: Negative. Negative for fever, chills, weight loss, malaise/fatigue and diaphoresis.   HENT: Negative. Negative for hearing loss, ear pain, nosebleeds, congestion, sore throat, neck pain, tinnitus and ear discharge.   Respiratory: Negative. Negative for cough, hemoptysis, sputum production, shortness of breath, wheezing and stridor.   Cardiovascular: Negative. Negative for chest pain, palpitations, orthopnea, claudication, leg swelling and PND.   Gastrointestinal: Denies nausea, vomiting, diarrhea, constipation, heartburn, melena or hematochezia.  Genitourinary: Denies dysuria, hematuria, urinary incontinence, frequency or urgency.        Objective:     /72 (BP Location:  "Right arm, Patient Position: Sitting)   Pulse 95   Temp 36.6 °C (97.9 °F) (Temporal)   Ht 1.651 m (5' 5\")   Wt 62.1 kg (137 lb)   SpO2 97%  Body mass index is 22.8 kg/m².    Physical Exam:  Vitals reviewed.  Constitutional: Oriented to person, place, and time. appears well-developed and well-nourished. No distress.   Cardiovascular: Normal rate, regular rhythm, normal heart sounds and intact distal pulses. Exam reveals no gallop and no friction rub. No murmur heard. No carotid bruits.   Pulmonary/Chest: Effort normal and breath sounds normal. No stridor. No respiratory distress. no wheezes or rales. exhibits no tenderness.   Musculoskeletal: Normal range of motion. exhibits no edema. haley pedal pulses 2+.  Lymphadenopathy: No cervical or supraclavicular adenopathy.   Neurological: Alert and oriented to person, place, and time. exhibits normal muscle tone.  Skin: Skin is warm and dry. No diaphoresis. Erythematous macular rash in streaks on rt buttock and upper rt posterior thigh.  + blanching.  No dg.  No palp femoral lymph nodes.  Psychiatric: Normal mood and affect. Behavior is normal.      Assessment and Plan:     The following treatment plan was discussed:    1. Rash  ketoconazole (NIZORAL) 2 % Cream    clobetasol (TEMOVATE) 0.05 % Cream    try ketoconazole 1st.  then clobetasol.  if neither work email and will research [place] rashes.  consider derm referral if all failes.           Followup: Return if symptoms worsen or fail to improve.  "

## 2019-08-12 ENCOUNTER — NON-PROVIDER VISIT (OUTPATIENT)
Dept: MEDICAL GROUP | Facility: MEDICAL CENTER | Age: 24
End: 2019-08-12
Payer: COMMERCIAL

## 2019-08-12 DIAGNOSIS — Z23 NEED FOR MENINGOCOCCAL VACCINATION: ICD-10-CM

## 2019-08-12 DIAGNOSIS — Z11.1 SCREENING EXAMINATION FOR PULMONARY TUBERCULOSIS: ICD-10-CM

## 2019-08-12 PROCEDURE — 90621 MENB-FHBP VACC 2/3 DOSE IM: CPT | Performed by: NURSE PRACTITIONER

## 2019-08-12 PROCEDURE — 90471 IMMUNIZATION ADMIN: CPT | Performed by: NURSE PRACTITIONER

## 2019-08-13 ENCOUNTER — NON-PROVIDER VISIT (OUTPATIENT)
Dept: URGENT CARE | Facility: CLINIC | Age: 24
End: 2019-08-13

## 2019-08-13 DIAGNOSIS — Z11.1 PPD SCREENING TEST: ICD-10-CM

## 2019-08-13 PROCEDURE — 86580 TB INTRADERMAL TEST: CPT | Performed by: PHYSICIAN ASSISTANT

## 2019-08-13 NOTE — NON-PROVIDER
"Brigid Fernandes is a 24 y.o. female here for a non-provider visit for:   TRUMENBA (Men B) 1 of 1    Reason for immunization: continue or complete series started at the office  Immunization records indicate need for vaccine: Yes, confirmed with Epic  Minimum interval has been met for this vaccine: Yes  ABN completed: Not Indicated    Order and dose verified by: aed  VIS Dated  8-9-2016 was given to patient: Yes  All IAC Questionnaire questions were answered \"No.\"    Patient tolerated injection and no adverse effects were observed or reported: Yes    Pt scheduled for next dose in series: Not Indicated    "

## 2019-08-15 ENCOUNTER — NON-PROVIDER VISIT (OUTPATIENT)
Dept: URGENT CARE | Facility: CLINIC | Age: 24
End: 2019-08-15

## 2019-08-15 LAB — TB WHEAL 3D P 5 TU DIAM: NORMAL MM

## 2019-08-15 NOTE — NON-PROVIDER
Brigid Fernandes is a 24 y.o. female here for a non-provider visit for PPD reading -- Step 1 of 2.      1.  Resulted in Epic under enter/edit results? Yes   2.  TB evaluation questionnaire scanned into chart and original given to patient?Yes      3. Was induration greater than 0 mm? No.    If Step 1 of 2, when is patient returning for second step (delete if N/A): 08/20/2019    Routed to PCP? No

## 2020-04-20 ENCOUNTER — TELEPHONE (OUTPATIENT)
Dept: MEDICAL GROUP | Facility: LAB | Age: 25
End: 2020-04-20

## 2020-04-20 NOTE — TELEPHONE ENCOUNTER
1. Caller Name: Brigid                        Call Back Number: [phone]  [facility] PCP or Specialty Provider: Yes Mitzi Mora        2.  Does patient have any active symptoms of respiratory illness (fever OR cough OR shortness of breath OR sore throat)? Yes, the patient reports the following respiratory symptoms: fever of at least 100.4°F (38°C) or greater, sore throat and headache x 3 days.    3.  Does patient have any comoribidities? None     4.  Has the patient traveled in the last 14 days OR had any known contact with someone who is suspected or confirmed to have COVID-19?  No.    5. Disposition: Advised to go to  at [facility] to have her ear checked. She had an allergic reaction to her ear buds and her ear is red and painful along with the above respiratory symptoms. She verbalized agreement and understanding.     Note routed to [facility] Provider: FYI only.